# Patient Record
Sex: FEMALE | Race: WHITE | URBAN - NONMETROPOLITAN AREA
[De-identification: names, ages, dates, MRNs, and addresses within clinical notes are randomized per-mention and may not be internally consistent; named-entity substitution may affect disease eponyms.]

---

## 2023-05-23 ENCOUNTER — TELEPHONE (OUTPATIENT)
Dept: VASCULAR SURGERY | Age: 63
End: 2023-05-23

## 2023-05-23 NOTE — TELEPHONE ENCOUNTER
CALLED AND LVM WITH PATIENT TO SCHEDULE FROM REFERRAL. IF PATIENT RETURNS CALL, PLEASE PLACE THEM IN A 15 MINUTE SLOT WITH ELLIE ON A Thursday MORNING SO SHE CAN MEET WITH HERNAN. PLEASE PLACE   NP -LYMPHEDEMA- SALEEM SILVERIO REFERRAL  IN THE APPT NOTES     THANK YOU.

## 2023-06-01 ENCOUNTER — OFFICE VISIT (OUTPATIENT)
Dept: VASCULAR SURGERY | Age: 63
End: 2023-06-01
Payer: COMMERCIAL

## 2023-06-01 VITALS
SYSTOLIC BLOOD PRESSURE: 152 MMHG | OXYGEN SATURATION: 98 % | TEMPERATURE: 97.5 F | DIASTOLIC BLOOD PRESSURE: 90 MMHG | HEART RATE: 79 BPM

## 2023-06-01 DIAGNOSIS — M79.605 LEG PAIN, LEFT: ICD-10-CM

## 2023-06-01 DIAGNOSIS — M79.604 LEG PAIN, RIGHT: ICD-10-CM

## 2023-06-01 DIAGNOSIS — I89.0 PRIMARY LYMPHEDEMA: Primary | ICD-10-CM

## 2023-06-01 DIAGNOSIS — M79.89 LEG SWELLING: ICD-10-CM

## 2023-06-01 PROCEDURE — 99203 OFFICE O/P NEW LOW 30 MIN: CPT | Performed by: NURSE PRACTITIONER

## 2023-06-01 RX ORDER — LISINOPRIL 10 MG/1
TABLET ORAL
COMMUNITY

## 2023-06-01 RX ORDER — DIAZEPAM 10 MG/1
TABLET ORAL
COMMUNITY

## 2023-06-01 RX ORDER — POTASSIUM CHLORIDE 20 MEQ/1
TABLET, EXTENDED RELEASE ORAL
COMMUNITY

## 2023-06-01 RX ORDER — ALBUTEROL SULFATE 90 UG/1
AEROSOL, METERED RESPIRATORY (INHALATION)
COMMUNITY

## 2023-06-01 RX ORDER — FUROSEMIDE 40 MG/1
40 TABLET ORAL DAILY
COMMUNITY
Start: 2023-05-17

## 2023-06-01 RX ORDER — CARISOPRODOL 350 MG/1
TABLET ORAL
COMMUNITY

## 2023-06-01 RX ORDER — BUPROPION HYDROCHLORIDE 150 MG/1
TABLET, EXTENDED RELEASE ORAL
COMMUNITY

## 2023-06-01 RX ORDER — METHYLPREDNISOLONE 4 MG/1
TABLET ORAL
COMMUNITY

## 2023-06-01 RX ORDER — CITALOPRAM 10 MG/1
10 TABLET ORAL DAILY
COMMUNITY
Start: 2023-05-17

## 2023-06-01 RX ORDER — LEVOFLOXACIN 500 MG/1
TABLET, FILM COATED ORAL
COMMUNITY
Start: 2023-05-17

## 2023-06-01 RX ORDER — CETIRIZINE HYDROCHLORIDE 10 MG/1
TABLET ORAL
COMMUNITY

## 2023-06-01 RX ORDER — PROMETHAZINE HYDROCHLORIDE 12.5 MG/1
TABLET ORAL
COMMUNITY

## 2023-06-01 RX ORDER — POTASSIUM CHLORIDE 1500 MG/1
20 TABLET, FILM COATED, EXTENDED RELEASE ORAL DAILY
COMMUNITY
Start: 2023-05-17

## 2023-06-01 RX ORDER — CYCLOBENZAPRINE HCL 10 MG
TABLET ORAL
COMMUNITY
Start: 2023-05-17

## 2023-06-01 RX ORDER — ERGOCALCIFEROL 1.25 MG/1
CAPSULE ORAL
COMMUNITY

## 2023-06-01 RX ORDER — AZITHROMYCIN 250 MG/1
TABLET, FILM COATED ORAL
COMMUNITY

## 2023-06-01 NOTE — PROGRESS NOTES
elevation  Good moisturization  Good skin care  Follow up after with venous scan for reflux  Coflex applied from above ankle to knee only due to fear of ulcerative development in crevices  Referral to [facility] for wounds  Try lymphedema navya  Diet   excercise         An electronic signature was used to authenticate this note.     --Celio Palacios, APRN

## 2023-06-05 ENCOUNTER — HOSPITAL ENCOUNTER (OUTPATIENT)
Dept: WOUND CARE | Age: 63
Discharge: HOME | End: 2023-06-05
Payer: COMMERCIAL

## 2023-06-05 VITALS
RESPIRATION RATE: 18 BRPM | HEART RATE: 94 BPM | DIASTOLIC BLOOD PRESSURE: 67 MMHG | TEMPERATURE: 97.1 F | SYSTOLIC BLOOD PRESSURE: 129 MMHG | BODY MASS INDEX: 43.36 KG/M2 | HEIGHT: 64 IN | WEIGHT: 254 LBS

## 2023-06-05 DIAGNOSIS — L97.922 SKIN ULCER OF LEFT LOWER LEG, WITH FAT LAYER EXPOSED (HCC): ICD-10-CM

## 2023-06-05 DIAGNOSIS — I89.0 LYMPHEDEMA: ICD-10-CM

## 2023-06-05 PROCEDURE — 97597 DBRDMT OPN WND 1ST 20 CM/<: CPT

## 2023-06-05 PROCEDURE — 99214 OFFICE O/P EST MOD 30 MIN: CPT

## 2023-06-05 PROCEDURE — 97597 DBRDMT OPN WND 1ST 20 CM/<: CPT | Performed by: NURSE PRACTITIONER

## 2023-06-05 PROCEDURE — 99213 OFFICE O/P EST LOW 20 MIN: CPT | Performed by: NURSE PRACTITIONER

## 2023-06-05 ASSESSMENT — PAIN DESCRIPTION - FREQUENCY: FREQUENCY: INTERMITTENT

## 2023-06-05 ASSESSMENT — VISUAL ACUITY: OU: 1

## 2023-06-05 ASSESSMENT — PAIN DESCRIPTION - PAIN TYPE: TYPE: CHRONIC PAIN;ACUTE PAIN

## 2023-06-05 ASSESSMENT — PAIN DESCRIPTION - ONSET: ONSET: ON-GOING

## 2023-06-05 ASSESSMENT — PAIN DESCRIPTION - DESCRIPTORS: DESCRIPTORS: OTHER (COMMENT)

## 2023-06-05 ASSESSMENT — PAIN SCALES - GENERAL: PAINLEVEL_OUTOF10: 4

## 2023-06-05 ASSESSMENT — PAIN DESCRIPTION - LOCATION: LOCATION: LEG

## 2023-06-05 ASSESSMENT — PAIN DESCRIPTION - ORIENTATION: ORIENTATION: LEFT

## 2023-06-05 ASSESSMENT — PAIN - FUNCTIONAL ASSESSMENT: PAIN_FUNCTIONAL_ASSESSMENT: PREVENTS OR INTERFERES SOME ACTIVE ACTIVITIES AND ADLS

## 2023-06-05 NOTE — PLAN OF CARE
[place] Application   Below Knee    NAME:  Julio Belcher  YOB: 1960  MEDICAL RECORD NUMBER:  [id number]  DATE:  6/5/2023    Ana Lilia Bennett boot: Applied moisturizing agent to dry skin as needed. Appied primary and secondary dressing as ordered. Applied Unna roll from toes to knee overlapping each time. Applied ace wrap or coban from toes to below the knee. Instructed patient/caregiver to keep dressing dry and intact. DO NOT REMOVE DRESSING. Instructed pt/family/caregiver to report excessive draining, loose bandage, wet dressing, severe pain or tingling in toes. Applied [place] dressing below the knee to left lower leg. Unna Boot(s) were applied per  Guidelines.      Electronically signed by Selvin Fishman RN on 6/5/2023 at 11:36 AM

## 2023-06-05 NOTE — DISCHARGE INSTRUCTIONS
[address] and Hyperbaric Oxygen Therapy   Physician Orders and Discharge Instructions  [address]  Telephone: [phone]    NAME:  Christianne Hooks  YOB: 1960  MEDICAL RECORD NUMBER:  [id number]  DATE:  6/5/2023    Discharge condition: Stable    Discharge to: Home    Left via:Private automobile    Accompanied by:  self    ECF/HHA: none      You are scheduled for Lower extremity arterial study (ARI) is scheduled  on  June 13 @ 9, register in [address], downstairs in this building, then proceed to [address] [place] [address],[address] for the test.    Please do not smoke for 2 hours before this test.  Cut off your unna boot prior to the leg, shower, and place mepilex over the wound and leave in place til your visit. Dressing Orders:  Location of Wound: Left lower leg  Wash with soap and water. Apply  Restore AG to wound bed. Miriam Ruffing Apply Calamine Coflex Unnaboot  from toes to knee. Change once weekly . Compression Therapy is an important part of your program of care. Compression makes it easier for your body to pump the blood from your legs back to your heart, helping alleviate a condition called chronic venous insufficiency. Chronic venous insufficiency is an underlying cause of your injury, and managing it properly will help you to heal.      Keep the bandage in place at all times unless you experience foot pain or numbness or coolness of the toes. Do not attempt to remove and reapply the bandage system. Give it time. Your bandage may feel tight at first. This is normal. Your bandage will become more comfortable as swelling goes down. Be active, as suggested by your healthcare provider. Daily walks or mild exercise encourages better blood flow to aid healing. Put your feet up when sitting for long periods of time. Keep the bandage dry.  Wet compression bandages are more likely to slip down and/or cause damage to good

## 2023-06-05 NOTE — PLAN OF CARE
Problem: Discharge Planning  Goal: Discharge to home or other facility with appropriate resources  Outcome: Progressing     Problem: Pain  Goal: Verbalizes/displays adequate comfort level or baseline comfort level  Outcome: Progressing     Problem: Wound:  Goal: Will show signs of wound healing; wound closure and no evidence of infection  Description: Will show signs of wound healing; wound closure and no evidence of infection  Outcome: Progressing     Problem: Venous:  Goal: Signs of wound healing will improve  Description: Signs of wound healing will improve  Outcome: Progressing     Problem: Compression therapy:  Goal: Will be free from complications associated with compression therapy  Description: Will be free from complications associated with compression therapy  Outcome: Progressing     Problem: Weight control:  Goal: Ability to maintain an optimal weight for height and age will be supported  Description: Ability to maintain an optimal weight for height and age will be supported  Outcome: Progressing

## 2023-06-19 ENCOUNTER — HOSPITAL ENCOUNTER (OUTPATIENT)
Dept: WOUND CARE | Age: 63
Discharge: HOME | End: 2023-06-19
Payer: COMMERCIAL

## 2023-06-19 VITALS
RESPIRATION RATE: 18 BRPM | TEMPERATURE: 97.1 F | WEIGHT: 254 LBS | DIASTOLIC BLOOD PRESSURE: 67 MMHG | BODY MASS INDEX: 43.36 KG/M2 | HEIGHT: 64 IN | HEART RATE: 94 BPM | SYSTOLIC BLOOD PRESSURE: 130 MMHG

## 2023-06-19 DIAGNOSIS — L97.922 SKIN ULCER OF LEFT LOWER LEG, WITH FAT LAYER EXPOSED (HCC): ICD-10-CM

## 2023-06-19 DIAGNOSIS — I89.0 LYMPHEDEMA: Primary | ICD-10-CM

## 2023-06-19 PROCEDURE — 99212 OFFICE O/P EST SF 10 MIN: CPT

## 2023-06-19 PROCEDURE — 99212 OFFICE O/P EST SF 10 MIN: CPT | Performed by: NURSE PRACTITIONER

## 2023-06-19 RX ORDER — DOXYCYCLINE 100 MG/1
100 CAPSULE ORAL 2 TIMES DAILY
Qty: 20 CAPSULE | Refills: 0 | Status: SHIPPED | OUTPATIENT
Start: 2023-06-19 | End: 2023-06-29

## 2023-06-19 NOTE — PLAN OF CARE
Problem: Discharge Planning  Goal: Discharge to home or other facility with appropriate resources  6/19/2023 1527 by Doni Alcantara RN  Outcome: Completed  6/19/2023 1526 by Doni Alcantara RN  Outcome: Progressing

## 2023-06-19 NOTE — PROGRESS NOTES
ordered. Most insurance companies do not pay for these unless you have an open ulceration currently. 8)  Replace your stockings every 3-6 months as they stretch out and become less effective. 9) Compression stockings should be handwashed and air-dryed to continue to work properly. 10) If you are overweight, losing weight can be helpful. [address] [place] [address],[address] follow up visit ____as needed_________________________  (Please note your next appointment above and if you are unable to keep, kindly give a 24 hour notice. Thank you.)          If you experience any of the following, please call the [facility] during business hours:    * Increase in Pain  * Temperature over 101  * Increase in drainage from your wound  * Drainage with a foul odor  * Bleeding  * Increase in swelling  * Need for compression bandage changes due to slippage, breakthrough drainage. If you need medical attention outside of the business hours of the [address] please contact your PCP or go to the nearest emergency room.           Electronically signed by TANA Ritter CNP on 6/19/2023 at 10:46 AM

## 2023-08-17 ENCOUNTER — HOSPITAL ENCOUNTER (OUTPATIENT)
Dept: WOUND CARE | Age: 63
Discharge: HOME | End: 2023-08-17
Payer: COMMERCIAL

## 2023-08-17 VITALS
BODY MASS INDEX: 43.36 KG/M2 | RESPIRATION RATE: 20 BRPM | DIASTOLIC BLOOD PRESSURE: 71 MMHG | WEIGHT: 254 LBS | TEMPERATURE: 97.2 F | HEIGHT: 64 IN | SYSTOLIC BLOOD PRESSURE: 125 MMHG | HEART RATE: 101 BPM

## 2023-08-17 DIAGNOSIS — I89.0 LYMPHEDEMA: ICD-10-CM

## 2023-08-17 DIAGNOSIS — L97.912 SKIN ULCER OF RIGHT LOWER LEG, WITH FAT LAYER EXPOSED (HCC): Primary | ICD-10-CM

## 2023-08-17 PROCEDURE — 29580 STRAPPING UNNA BOOT: CPT

## 2023-08-17 PROCEDURE — 99213 OFFICE O/P EST LOW 20 MIN: CPT

## 2023-08-17 PROCEDURE — 99213 OFFICE O/P EST LOW 20 MIN: CPT | Performed by: NURSE PRACTITIONER

## 2023-08-17 RX ORDER — BETAMETHASONE DIPROPIONATE 0.05 %
OINTMENT (GRAM) TOPICAL ONCE
OUTPATIENT
Start: 2023-08-17 | End: 2023-08-17

## 2023-08-17 RX ORDER — CLOBETASOL PROPIONATE 0.5 MG/G
OINTMENT TOPICAL ONCE
OUTPATIENT
Start: 2023-08-17 | End: 2023-08-17

## 2023-08-17 RX ORDER — GENTAMICIN SULFATE 1 MG/G
OINTMENT TOPICAL ONCE
OUTPATIENT
Start: 2023-08-17 | End: 2023-08-17

## 2023-08-17 RX ORDER — SODIUM CHLOR/HYPOCHLOROUS ACID 0.033 %
SOLUTION, IRRIGATION IRRIGATION ONCE
OUTPATIENT
Start: 2023-08-17 | End: 2023-08-17

## 2023-08-17 RX ORDER — LIDOCAINE 50 MG/G
OINTMENT TOPICAL ONCE
OUTPATIENT
Start: 2023-08-17 | End: 2023-08-17

## 2023-08-17 RX ORDER — DOXYCYCLINE HYCLATE 100 MG/1
100 CAPSULE ORAL 2 TIMES DAILY
COMMUNITY

## 2023-08-17 RX ORDER — IBUPROFEN 200 MG
TABLET ORAL ONCE
OUTPATIENT
Start: 2023-08-17 | End: 2023-08-17

## 2023-08-17 RX ORDER — LIDOCAINE HYDROCHLORIDE 20 MG/ML
JELLY TOPICAL ONCE
OUTPATIENT
Start: 2023-08-17 | End: 2023-08-17

## 2023-08-17 RX ORDER — LIDOCAINE HYDROCHLORIDE 40 MG/ML
SOLUTION TOPICAL ONCE
OUTPATIENT
Start: 2023-08-17 | End: 2023-08-17

## 2023-08-17 RX ORDER — BACITRACIN ZINC AND POLYMYXIN B SULFATE 500; 1000 [USP'U]/G; [USP'U]/G
OINTMENT TOPICAL ONCE
OUTPATIENT
Start: 2023-08-17 | End: 2023-08-17

## 2023-08-17 RX ORDER — LIDOCAINE 40 MG/G
CREAM TOPICAL ONCE
OUTPATIENT
Start: 2023-08-17 | End: 2023-08-17

## 2023-08-17 RX ORDER — GINSENG 100 MG
CAPSULE ORAL ONCE
OUTPATIENT
Start: 2023-08-17 | End: 2023-08-17

## 2023-08-17 ASSESSMENT — VISUAL ACUITY: OU: 1

## 2023-08-17 NOTE — PLAN OF CARE
[place] Application   Below Knee    NAME:  Maulik Lacey  YOB: 1960  MEDICAL RECORD NUMBER:  [id number]  DATE:  8/17/2023    Nae Dumas boot: Applied moisturizing agent to dry skin as needed. Appied primary and secondary dressing as ordered. Applied Unna roll from toes to knee overlapping each time. Applied ace wrap or coban from toes to below the knee. Secured with tape and/or metal clips covered with tape. Instructed patient/caregiver to keep dressing dry and intact. DO NOT REMOVE DRESSING. Instructed pt/family/caregiver to report excessive draining, loose bandage, wet dressing, severe pain or tingling in toes. Applied [place] dressing below the knee to right lower leg. Applied [place] dressing below the knee to left lower leg. Unna Boot(s) were applied per  Guidelines.      Electronically signed by Jorge Roa RN on 8/17/2023 at 1:57 PM

## 2023-08-17 NOTE — PROGRESS NOTES
Assessment:    The patientspain is   . Please refer to nursing measurements and assessment regarding wound pre and postdebridement. Wound 08/17/23 Tibial Distal;Posterior;Right wound 1- right leg venous (Active)   Wound Image   08/17/23 1315   Wound Etiology Venous 08/17/23 1315   Dressing Status New dressing applied 08/17/23 1318   Wound Cleansed Soap and water 08/17/23 1315   Wound Length (cm) 0.3 cm 08/17/23 1315   Wound Width (cm) 0.5 cm 08/17/23 1315   Wound Depth (cm) 0.1 cm 08/17/23 1315   Wound Surface Area (cm^2) 0.15 cm^2 08/17/23 1315   Wound Volume (cm^3) 0.015 cm^3 08/17/23 1315   Wound Assessment Pink/red 08/17/23 1315   Drainage Amount Scant (moist but unmeasurable) 08/17/23 1315   Drainage Description Serosanguinous 08/17/23 1315   Odor None 08/17/23 1315   Janelle-wound Assessment Intact 08/17/23 1315   Margins Defined edges 08/17/23 1315   Wound Thickness Description not for Pressure Injury Full thickness 08/17/23 1315   Number of days: 0        Assessment    1. Lymphedema    2. Skin ulcer of right lower leg, with fat layer exposed ([address])        Plan for wound - Dress per physician order  Treatment:     Compression : Yes   Offloading : No   Dressing : restore AG, blue coflex wrap    Discussed importance of compression, leg elevation, wound care, and plan of care. Patient understanding and questions answered. I spent a total of  30 minutes face to face with the patient. Over 100% of that time was spent on counseling and care coordination. Patient was told that if symptoms worsen or new symptoms develop they are to go to the emergency department immediately. Patient was educated on diagnosis and treatment plan. All of patient's questions were answered, and the patient understands the discharge plan. Discussed appropriate home care of this wound. Wound redressed. Patient instructions were given.   Recommend no smoking  Offloading instructions given      [facility]

## 2023-08-17 NOTE — DISCHARGE INSTRUCTIONS
[address] and Hyperbaric Oxygen Therapy   Physician Orders and Discharge Instructions  [address] [facility],[address] [place] [address] Bl, [address]  Telephone: [phone]    NAME:  Iris Louis  YOB: 1960  MEDICAL RECORD NUMBER:  [id number]  DATE:  8/17/2023    Discharge condition: Stable    Discharge to: Home    Left via:Private automobile    Accompanied by:  self    ECF/H[facility]: [facility]    Dressing Orders:  Right leg: soap and water wash, apply restore AG, secure with blue coflex weekly. Treatment Orders:  Protein rich diet (unless restricted by your physician); Multivitamin daily; Elevate legs above the level of your heart when sitting 3-4 times daily for at least one hour each time, avoid standing for long periods of time. [facility] follow up visit ____1 week with Saundra_________________________  (Please note your next appointment above and if you are unable to keep, kindly give a 24 hour notice. Thank you.)          If you experience any of the following, please call the [facility] during business hours:    * Increase in Pain  * Temperature over 101  * Increase in drainage from your wound  * Drainage with a foul odor  * Bleeding  * Increase in swelling  * Need for compression bandage changes due to slippage, breakthrough drainage. If you need medical attention outside of the business hours of the [facility] please contact your PCP or go to the nearest emergency room.

## 2023-08-17 NOTE — HOME CARE
Compression [address] for Compression Stockings:     [address] [facility] [address] [facility] Paula Mullins, [address] [facility] f: [phone] f: [phone] [email or web address]      Ordering Center:     [address] [place] [address] [address] [place] [phone]  WOUND CARE Dept: [id number] [place] [address] [phone]    Patient Information:      Martine Estevez  [address] [facility]   [phone]   : 1960  AGE: 58 y.o. GENDER: female   TODAYS DATE:  2023    Insurance:      PRIMARY INSURANCE:  Plan: GENERIC COMMERCIAL  Coverage: GENERIC COMMERCIAL  Effective Date: 2023  Group Number: [unfilled]  Subscriber Number: [id number] - (Commercial)    Payer/Plan Subscr  Sex Relation Sub.  Ins. ID Effective Group Num   1. GENERIC COMMEKrista Balling 1960 Female Self [id number]                                    [address] [address], [place] [address]       Patient Information:      Problem List Items Addressed This Visit          Other    * (Principal) Skin ulcer of right lower leg, with fat layer exposed ([address])    Lymphedema - Primary       Wound 23 Tibial Distal;Posterior;Right wound 1- right leg venous (Active)   Wound Image   23 1315   Wound Etiology Venous 23 1315   Dressing Status New dressing applied 23 1318   Wound Cleansed Soap and water 23 1315   Wound Length (cm) 0.3 cm 23 1315   Wound Width (cm) 0.5 cm 23 1315   Wound Depth (cm) 0.1 cm 23 1315   Wound Surface Area (cm^2) 0.15 cm^2 23 1315   Wound Volume (cm^3) 0.015 cm^3 23 1315   Wound Assessment Pink/red 23 1315   Drainage Amount Scant (moist but unmeasurable) 23 1315   Drainage Description Serosanguinous 23 1315   Odor None 23 1315   Janelle-wound Assessment Intact 23 1315   Margins Defined edges 23 1315   Wound Thickness Description not for Pressure Injury Full

## 2023-08-24 ENCOUNTER — HOSPITAL ENCOUNTER (OUTPATIENT)
Dept: WOUND CARE | Age: 63
Discharge: HOME | End: 2023-08-24

## 2025-08-25 ENCOUNTER — HOSPITAL ENCOUNTER (OUTPATIENT)
Dept: MRI IMAGING | Facility: HOSPITAL | Age: 65
Discharge: HOME | End: 2025-08-25
Admitting: ORTHOPAEDIC SURGERY
Payer: MEDICARE

## 2025-08-25 DIAGNOSIS — M54.16 LUMBAR RADICULOPATHY: ICD-10-CM

## 2025-08-25 PROCEDURE — 72148 MRI LUMBAR SPINE W/O DYE: CPT
